# Patient Record
Sex: FEMALE | Race: OTHER | NOT HISPANIC OR LATINO | Employment: UNEMPLOYED | URBAN - METROPOLITAN AREA
[De-identification: names, ages, dates, MRNs, and addresses within clinical notes are randomized per-mention and may not be internally consistent; named-entity substitution may affect disease eponyms.]

---

## 2023-05-11 PROBLEM — E66.811 CLASS 1 OBESITY WITH SERIOUS COMORBIDITY AND BODY MASS INDEX (BMI) OF 32.0 TO 32.9 IN ADULT: Chronic | Status: ACTIVE | Noted: 2023-05-10

## 2024-09-06 ENCOUNTER — TELEPHONE (OUTPATIENT)
Dept: NEUROLOGY | Facility: CLINIC | Age: 54
End: 2024-09-06
Payer: MEDICAID

## 2024-09-06 NOTE — TELEPHONE ENCOUNTER
----- Message from Shiela Roberson MD sent at 9/5/2024  4:20 PM CDT -----  Regarding: RE: appt access  Contact: pt @ [phone]  Followup is fine  ----- Message -----  From: Mateusz Post MA  Sent: 9/5/2024   3:18 PM CDT  To: Shiela Roberson MD  Subject: FW: appt access                                    ----- Message -----  From: Kiana Alberto  Sent: 9/5/2024   3:14 PM CDT  To: [facility]  Subject: appt access                                      Pt is needing to be seen as she is complaining of dizziness with loss of consciousness since she was release from the hospital on 03/27/24. Pt is requesting a call back to be further advised. Please call to advise. Thank you for all you are doing.

## 2024-09-24 ENCOUNTER — TELEPHONE (OUTPATIENT)
Dept: NEUROLOGY | Facility: CLINIC | Age: 54
End: 2024-09-24
Payer: MEDICAID

## 2024-09-24 NOTE — TELEPHONE ENCOUNTER
----- Message from Sana Dubois sent at 9/24/2024  2:39 PM CDT -----  Regarding: appt  Contact: [phone]  Pt stated she missed the appt this morning and would like it r/s. Attempted to r/s, no access. Pls call to discuss.

## 2024-09-25 ENCOUNTER — DOCUMENTATION ONLY (OUTPATIENT)
Dept: NEUROLOGY | Facility: CLINIC | Age: 54
End: 2024-09-25
Payer: MEDICAID

## 2024-09-25 ENCOUNTER — TELEPHONE (OUTPATIENT)
Dept: NEUROLOGY | Facility: CLINIC | Age: 54
End: 2024-09-25
Payer: MEDICAID

## 2024-09-25 NOTE — PROGRESS NOTES
Patient was a no-show for appt yesterday.  Arrived today 45 min late for 8 am appt.  Patient informed of clinic late policy and rescheduled for October.    Shiela Roberson MD  Vascular Neurology

## 2024-09-25 NOTE — TELEPHONE ENCOUNTER
----- Message from Marge Gallo sent at 9/25/2024  8:07 AM CDT -----  Regarding: late to appt  Contact: Pt @ [phone]  Pt is calling to advise the office of running late for their appt today. Pt is still asking to be seen. Please call to advise. Thanks.          ETA:  10 mins

## 2024-09-25 NOTE — TELEPHONE ENCOUNTER
----- Message from Bethany Ashraf sent at 9/25/2024  8:41 AM CDT -----  Type:  Patient Returning Call    Who Called: LISANDRA SUTHERLAND [[id number]]    Who Left Message for Patient: Nayan     Does the patient know what this is regarding? Return call     Best Call Back Number: Telephone Information:  Mobile          [phone]

## 2024-10-11 ENCOUNTER — OFFICE VISIT (OUTPATIENT)
Dept: NEUROLOGY | Facility: CLINIC | Age: 54
End: 2024-10-11
Payer: MEDICAID

## 2024-10-11 VITALS
HEIGHT: 66 IN | DIASTOLIC BLOOD PRESSURE: 110 MMHG | WEIGHT: 199.94 LBS | SYSTOLIC BLOOD PRESSURE: 170 MMHG | BODY MASS INDEX: 32.13 KG/M2 | HEART RATE: 65 BPM

## 2024-10-11 DIAGNOSIS — I10 PRIMARY HYPERTENSION: Chronic | ICD-10-CM

## 2024-10-11 DIAGNOSIS — E11.9 TYPE 2 DIABETES MELLITUS WITHOUT COMPLICATION, WITHOUT LONG-TERM CURRENT USE OF INSULIN: Chronic | ICD-10-CM

## 2024-10-11 DIAGNOSIS — M25.512 CHRONIC LEFT SHOULDER PAIN: ICD-10-CM

## 2024-10-11 DIAGNOSIS — R42 DIZZINESS: Primary | ICD-10-CM

## 2024-10-11 DIAGNOSIS — R53.1 LEFT-SIDED WEAKNESS: ICD-10-CM

## 2024-10-11 DIAGNOSIS — G89.29 CHRONIC LEFT SHOULDER PAIN: ICD-10-CM

## 2024-10-11 DIAGNOSIS — I69.30 HISTORY OF STROKE WITH RESIDUAL DEFICIT: ICD-10-CM

## 2024-10-11 PROCEDURE — 99999 PR PBB SHADOW E&M-EST. PATIENT-LVL IV: CPT | Mod: PBBFAC,,, | Performed by: PSYCHIATRY & NEUROLOGY

## 2024-10-11 PROCEDURE — 99214 OFFICE O/P EST MOD 30 MIN: CPT | Mod: PBBFAC | Performed by: PSYCHIATRY & NEUROLOGY

## 2024-10-11 RX ORDER — CLONIDINE HYDROCHLORIDE 0.2 MG/1
0.2 TABLET ORAL ONCE
COMMUNITY

## 2024-10-11 RX ORDER — METFORMIN HYDROCHLORIDE 500 MG/1
500 TABLET ORAL 2 TIMES DAILY
COMMUNITY
Start: 2024-08-22

## 2024-10-11 RX ORDER — LORATADINE 10 MG/1
10 TABLET ORAL DAILY PRN
COMMUNITY
Start: 2024-10-04

## 2024-10-11 RX ORDER — HYDROCHLOROTHIAZIDE 12.5 MG/1
12.5 TABLET ORAL DAILY
COMMUNITY

## 2024-10-11 RX ORDER — LISINOPRIL 40 MG/1
1 TABLET ORAL DAILY
COMMUNITY

## 2024-10-11 RX ORDER — FLUOXETINE 10 MG/1
10 CAPSULE ORAL DAILY
COMMUNITY
Start: 2024-07-15

## 2024-10-11 RX ORDER — ALPRAZOLAM 2 MG/1
2 TABLET ORAL 2 TIMES DAILY PRN
COMMUNITY

## 2024-10-11 NOTE — PROGRESS NOTES
Vascular Neurology  Clinic Note    Reason For Visit (Chief Complaint): syncope    HPI: 54 y.o. right handed man with PMH HTN with admission in May 2023 for BG stroke.  She presents for followup after ED visit for syncope.    Patient received TNK via Telestroke for L sided weakness and dysarthria (NIHSS 4).  She was transferred to [facility] for further care.  She was started on DAPT.   NIHSS 5 on discharge.    She has been going to therapy but this is her first neuro appt post-stroke.  Blood pressure has been consistently high.  Did not take blood pressure meds today.  Denies bleeding or bruising.  Still taking Plavix and ASA.  Not eating many vegetables.  Eating chicken.  Likes fruits.  Sons assist with bathing and dressing.  No recent falls.  Worked as a caregiver prior to stroke.    Has been having pain throughout L upper arm that is often severe and is not responsive to tylenol.    Interval Hx:  Had ED visit in March for syncope.  She was sitting at the hair dressers and then got up to use the restroom and felt lightheaded and syncopized.  Denies having history of syncope.  Workup in ED unremarkable. Frequently feels weak and lightheaded if she stands for too long.  Does not feel dizzy first thing in the morning when she gets out of bed.  Denies palpitations or chest pain.    Brain Imaging:  MRI Brain 5/9/23:  Acute deep white matter infarcts on the right extending to the basal ganglia and internal capsule.  No intracranial hemorrhage.  Mild mass effect on the right lateral ventricle without midline shift or herniation.    CTA H/N 5/7/23:  3. CTA head and neck with no evidence of large vessel occlusion or dissection.  4. 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  Bilateral persistent fetal circulation is seen with prominent bilateral posterior communicating arteries seen which supply flow to the bilateral PCAs.    Cardiac Evaluation:  TTE 5/8/23:  LVEF 65%  NL LV diastolic  function        Relevant Labwork:  Recent Labs   Lab 05/07/23 2122 05/08/23  0620   Hemoglobin A1C  --  6.7 H   LDL Cholesterol 87.6  --    HDL 40  --    Triglycerides 77  --    Cholesterol 143  --        I independently viewed the above imaging studies in addition to reviewing the report.  I reviewed the above labwork.    Review of Systems  Msk: +shoulder pain  Skin: negative for pruritis  Neuro: +syncope  All others negative    Past Medical History  Past Medical History:   Diagnosis Date    Acute gallstone pancreatitis 1/10/2020    Hypertension      Family History  No family history of strokes.  Mom had HTN an CAD.    Social History  Non smoker.  Lives with 2 sons.    Medication List with Changes/Refills   New Medications    WALKER MISC    1 each by Misc.(Non-Drug; Combo Route) route once daily. ROLLATOR   Current Medications    ALPRAZOLAM (XANAX) 2 MG TAB    Take 2 mg by mouth 2 (two) times daily as needed.    ASPIRIN 81 MG CHEW    Take 1 tablet (81 mg total) by mouth once daily.    ATORVASTATIN (LIPITOR) 40 MG TABLET    Take 1 tablet (40 mg total) by mouth once daily.    CLONIDINE (CATAPRES) 0.2 MG TABLET    Take 0.2 mg by mouth once.    FLUOXETINE 10 MG CAPSULE    Take 10 mg by mouth once daily.    HYDROCHLOROTHIAZIDE (HYDRODIURIL) 12.5 MG TAB    Take 12.5 mg by mouth once daily.    LISINOPRIL (PRINIVIL,ZESTRIL) 40 MG TABLET    Take 1 tablet by mouth once daily.    LORATADINE (CLARITIN) 10 MG TABLET    Take 10 mg by mouth daily as needed.    METFORMIN (GLUCOPHAGE) 500 MG TABLET    Take 500 mg by mouth 2 (two) times daily.    NIFEDIPINE (PROCARDIA-XL) 30 MG (OSM) 24 HR TABLET    Take 1 tablet (30 mg total) by mouth once daily.    PROMETHAZINE (PHENERGAN) 25 MG TABLET    Take 25 mg by mouth every 12 (twelve) hours as needed.   Discontinued Medications    CLONIDINE (CATAPRES) 0.1 MG TABLET    Take 2 tablets (0.2 mg total) by mouth every evening.    CLOPIDOGREL (PLAVIX) 75 MG TABLET    Take 1 tablet (75 mg total)  "by mouth once daily.    FLUOXETINE 20 MG CAPSULE    Take 1 capsule (20 mg total) by mouth once daily.    LISINOPRIL (PRINIVIL,ZESTRIL) 20 MG TABLET    Take 1 tablet (20 mg total) by mouth once daily.    PANTOPRAZOLE (PROTONIX) 20 MG TABLET    Take 1 tablet by mouth 2 (two) times daily.     Vital Signs  Pulse: 65  BP: (!) 170/110  Pain Score:   6  Pain Loc: Arm (left)  Height and Weight  Height: 5' 6" (167.6 cm)  Weight: 90.7 kg (199 lb 15.3 oz)  BSA (Calculated - sq m): 2.06 sq meters  BMI (Calculated): 32.3  Weight in (lb) to have BMI = 25: 154.6]      General: well appearing, mildly anxious  Neck: no carotid bruits  CV: RRR, nL S1&S2  Resp: breathing comfortably, no wheezing  Ext: pain on passive abduction of L shoulder    Mental Status: Alert and oriented, normal attention, speech fluent and prosodic, naming and repetition intact, follows multistep embedded commands, no e/o neglect or extinction  Cranial Nerves: PERRL, EOMI, VFF, sensation intact, L facial droop, TUP midline, no dysarthria, SCM/trap 5/5  Motor: L arm drift, finger taps slower on L  L shoulder abd 3/5, elbow flex/ex 4/5, wrist ext 4/5,  4/5  Full strength R arm and b/l legs  Sensory: intact light touch bilaterally  Coordination: no ataxia on finger-to-nose   Gait & Stance: normal  DTR: 3+ L biceps and patella    NIH Stroke Scale:    Level of Consciousness: 0 - alert  LOC Questions: 0 - answers both correctly  LOC Commands: 0 - performs both correctly  Best Gaze: 0 - normal  Visual: 0 - no visual loss  Facial Palsy: 2 - partial  Motor Left Arm: 1 - drift  Motor Right Arm: 0 - no drift  Motor Left Le - no drift  Motor Right Le - no drift  Limb Ataxia: 0 - absent  Sensory: 0 - normal  Best Language: 0 - no aphasia  Dysarthria: 0 - normal articulation  Extinction and Inattention: 0 - no neglect  NIH Stroke Scale Total: 3        ___________________  ASSESSMENT & PLAN  53 y.o. right handed man with PMH HTN with admission in May 2023 for BG " stroke.  Continues to have L sided weakness arm>leg.  Currently frequent dizziness with exertion or standing.  CTA during stroke worked up showed very diminutive and nearly occluded basilar.  Will repeat MRI and MRA - concern for vertebrobasilar symptoms.    - Continue ASA 81 mg daily for secondary stroke prevention  - BP elevated.  Goal <140/80.  - Stay well hydrated with water  - Sit down or lay down if you feel dizzy  - Rx for Rollator provided  - Referral to Ortho Shoulder  - RTC PRN    Problem List Items Addressed This Visit          Unprioritized    Primary hypertension (Chronic)    Overview     dx update         Type 2 diabetes mellitus without complication, without long-term current use of insulin (Chronic)    Relevant Medications    metFORMIN (GLUCOPHAGE) 500 MG tablet    Left-sided weakness    History of stroke with residual deficit    Left shoulder pain    Relevant Orders    Ambulatory referral/consult to Orthopedics     Other Visit Diagnoses       Dizziness    -  Primary    Relevant Orders    MRI Brain Ischemic Inter Pro Incl MRA w/o Con              Shiela Roberson MD  Vascular Neurology

## 2024-10-11 NOTE — PATIENT INSTRUCTIONS
- Continue aspirin for stroke prevention  - MRI brain  - Rx for Rollator Walker  - Referral to orthopedics for shoulder